# Patient Record
Sex: MALE | Race: OTHER
[De-identification: names, ages, dates, MRNs, and addresses within clinical notes are randomized per-mention and may not be internally consistent; named-entity substitution may affect disease eponyms.]

---

## 2017-10-04 ENCOUNTER — APPOINTMENT (OUTPATIENT)
Dept: CARDIOLOGY | Facility: CLINIC | Age: 57
End: 2017-10-04
Payer: COMMERCIAL

## 2017-10-04 ENCOUNTER — NON-APPOINTMENT (OUTPATIENT)
Age: 57
End: 2017-10-04

## 2017-10-04 VITALS
WEIGHT: 178 LBS | SYSTOLIC BLOOD PRESSURE: 136 MMHG | HEART RATE: 73 BPM | DIASTOLIC BLOOD PRESSURE: 60 MMHG | OXYGEN SATURATION: 98 % | BODY MASS INDEX: 28.61 KG/M2 | HEIGHT: 66 IN

## 2017-10-04 DIAGNOSIS — F10.10 ALCOHOL ABUSE, UNCOMPLICATED: ICD-10-CM

## 2017-10-04 DIAGNOSIS — E03.9 HYPOTHYROIDISM, UNSPECIFIED: ICD-10-CM

## 2017-10-04 PROCEDURE — 93000 ELECTROCARDIOGRAM COMPLETE: CPT

## 2017-10-04 PROCEDURE — 99205 OFFICE O/P NEW HI 60 MIN: CPT

## 2017-10-04 PROCEDURE — 93306 TTE W/DOPPLER COMPLETE: CPT

## 2023-01-31 ENCOUNTER — APPOINTMENT (OUTPATIENT)
Dept: CARDIOLOGY | Facility: CLINIC | Age: 63
End: 2023-01-31
Payer: MEDICAID

## 2023-01-31 ENCOUNTER — NON-APPOINTMENT (OUTPATIENT)
Age: 63
End: 2023-01-31

## 2023-01-31 VITALS
HEIGHT: 66 IN | BODY MASS INDEX: 28.45 KG/M2 | TEMPERATURE: 97.6 F | SYSTOLIC BLOOD PRESSURE: 110 MMHG | HEART RATE: 84 BPM | OXYGEN SATURATION: 98 % | DIASTOLIC BLOOD PRESSURE: 70 MMHG | WEIGHT: 177 LBS

## 2023-01-31 PROCEDURE — 93000 ELECTROCARDIOGRAM COMPLETE: CPT

## 2023-01-31 PROCEDURE — 99203 OFFICE O/P NEW LOW 30 MIN: CPT | Mod: 25

## 2023-01-31 NOTE — CARDIOLOGY SUMMARY
[de-identified] : 1/31/23, Sinus  Rhythm \par WITHIN NORMAL LIMITS\par  [de-identified] : Mild concentric LVH with EH with EF 70%, aortic valve sclerosis with no significant regurgitation or stenosis.  Stenosis.  Trace MR,, trace TR, trace PI.

## 2023-01-31 NOTE — DISCUSSION/SUMMARY
[Stable] : stable [Heart Failure Diastolic] : diastolic heart failure [COPD] : chronic obstructive pulmonary disease [Pulmonary Embolism] : pulmonary embolism [Echocardiogram] : an echocardiogram [Treadmill Exercise Test] : a treadmill stress test [None] : none [Patient] : the patient [Guardian] : the guardian [EKG obtained to assist in diagnosis and management of assessed problem(s)] : EKG obtained to assist in diagnosis and management of assessed problem(s) [FreeTextEntry1] : 63 yo male with h/o ETOH, hypertriglyceridemia, hypercholesterolemia, HTN, here for evaluation of exertionla chest discomfort.\par Poor historian.\par Scheduled for TTE and Ex nuc ST.

## 2023-02-01 DIAGNOSIS — I10 ESSENTIAL (PRIMARY) HYPERTENSION: ICD-10-CM

## 2023-02-01 DIAGNOSIS — R06.09 OTHER FORMS OF DYSPNEA: ICD-10-CM

## 2023-02-14 ENCOUNTER — APPOINTMENT (OUTPATIENT)
Dept: CARDIOLOGY | Facility: CLINIC | Age: 63
End: 2023-02-14

## 2023-02-21 ENCOUNTER — APPOINTMENT (OUTPATIENT)
Dept: CARDIOLOGY | Facility: CLINIC | Age: 63
End: 2023-02-21
Payer: MEDICAID

## 2023-02-21 PROCEDURE — 93306 TTE W/DOPPLER COMPLETE: CPT

## 2023-02-22 ENCOUNTER — APPOINTMENT (OUTPATIENT)
Dept: CARDIOLOGY | Facility: CLINIC | Age: 63
End: 2023-02-22

## 2023-07-17 NOTE — HISTORY OF PRESENT ILLNESS
[de-identified] : Patient is a 83 year old female with history of HTN, HLD, Anxiety/depression, HM presents for follow-up \par \par \par Patient reports she feels well \par She is doing well with diet and tries to remain active \par Denies any CP, SOB, HA, Dizziness, N/V or abdominal pain \par \par  [FreeTextEntry1] : 62-year-old Polish male last seen 5 years ago because of complaints of dyspnea on exertion since he came back from [place] he has had worsening dyspnea on moderate exertion, patient somewhat poor historian otherwise possibly due to long-standing alcohol abuse. \par \par As per previous records, patient has history of hypothyroidism and hypertension, hypertriglyceridemia.\par \par Poor historian, c/o exertional mid sternal chest discomfort x several months.  He denies any overt dyspnea palpitations or syncopal episodes. He is a nonsmoker. Works as a  man for [employer].